# Patient Record
Sex: MALE | Race: BLACK OR AFRICAN AMERICAN | Employment: FULL TIME | URBAN - METROPOLITAN AREA
[De-identification: names, ages, dates, MRNs, and addresses within clinical notes are randomized per-mention and may not be internally consistent; named-entity substitution may affect disease eponyms.]

---

## 2020-10-05 VITALS
TEMPERATURE: 97.9 F | BODY MASS INDEX: 37.93 KG/M2 | WEIGHT: 236 LBS | SYSTOLIC BLOOD PRESSURE: 131 MMHG | HEART RATE: 75 BPM | OXYGEN SATURATION: 95 % | DIASTOLIC BLOOD PRESSURE: 86 MMHG | HEIGHT: 66 IN

## 2020-10-05 RX ORDER — CLOTRIMAZOLE AND BETAMETHASONE DIPROPIONATE 10; .64 MG/G; MG/G
CREAM TOPICAL 2 TIMES DAILY
COMMUNITY
End: 2020-10-07

## 2020-10-05 RX ORDER — AMOXICILLIN AND CLAVULANATE POTASSIUM 875; 125 MG/1; MG/1
TABLET, FILM COATED ORAL EVERY 12 HOURS
COMMUNITY
End: 2020-10-07

## 2020-10-07 ENCOUNTER — OFFICE VISIT (OUTPATIENT)
Dept: INTERNAL MEDICINE CLINIC | Age: 25
End: 2020-10-07
Payer: COMMERCIAL

## 2020-10-07 ENCOUNTER — HOSPITAL ENCOUNTER (OUTPATIENT)
Dept: LAB | Age: 25
Discharge: HOME | End: 2020-10-07

## 2020-10-07 VITALS
BODY MASS INDEX: 37.7 KG/M2 | HEIGHT: 66 IN | OXYGEN SATURATION: 96 % | WEIGHT: 234.6 LBS | TEMPERATURE: 97.8 F | DIASTOLIC BLOOD PRESSURE: 80 MMHG | RESPIRATION RATE: 19 BRPM | SYSTOLIC BLOOD PRESSURE: 133 MMHG | HEART RATE: 83 BPM

## 2020-10-07 DIAGNOSIS — Z72.0 TOBACCO ABUSE: ICD-10-CM

## 2020-10-07 DIAGNOSIS — K21.9 GASTROESOPHAGEAL REFLUX DISEASE, UNSPECIFIED WHETHER ESOPHAGITIS PRESENT: ICD-10-CM

## 2020-10-07 DIAGNOSIS — B35.4 TINEA CORPORIS: ICD-10-CM

## 2020-10-07 DIAGNOSIS — I10 ESSENTIAL HYPERTENSION: Primary | ICD-10-CM

## 2020-10-07 DIAGNOSIS — Z00.00 HEALTHCARE MAINTENANCE: ICD-10-CM

## 2020-10-07 DIAGNOSIS — E66.01 SEVERE OBESITY (HCC): ICD-10-CM

## 2020-10-07 PROCEDURE — 99204 OFFICE O/P NEW MOD 45 MIN: CPT | Performed by: INTERNAL MEDICINE

## 2020-10-07 RX ORDER — VARENICLINE TARTRATE 1 MG/1
TABLET, FILM COATED ORAL
Qty: 60 TAB | Refills: 1 | Status: SHIPPED | OUTPATIENT
Start: 2020-10-07 | End: 2021-01-05

## 2020-10-07 RX ORDER — SUCRALFATE 1 G/10ML
1 SUSPENSION ORAL 4 TIMES DAILY
Qty: 400 ML | Refills: 0 | Status: SHIPPED | OUTPATIENT
Start: 2020-10-07 | End: 2021-10-25

## 2020-10-07 RX ORDER — VARENICLINE TARTRATE 25 MG
KIT ORAL
Qty: 1 DOSE PACK | Refills: 0 | Status: SHIPPED | OUTPATIENT
Start: 2020-10-07 | End: 2021-10-25

## 2020-10-07 RX ORDER — PHENOL/SODIUM PHENOLATE
20 AEROSOL, SPRAY (ML) MUCOUS MEMBRANE DAILY
Qty: 30 TAB | Refills: 5 | Status: SHIPPED | OUTPATIENT
Start: 2020-10-07 | End: 2021-10-25

## 2020-10-07 NOTE — PROGRESS NOTES
Daxa Gonzales presents today for No chief complaint on file. Is someone accompanying this pt? No    Is the patient using any DME equipment during OV? No    Depression Screening:  3 most recent PHQ Screens 10/7/2020   Little interest or pleasure in doing things Not at all   Feeling down, depressed, irritable, or hopeless Not at all   Total Score PHQ 2 0       Learning Assessment:  Learning Assessment 10/7/2020   PRIMARY LEARNER Patient   HIGHEST LEVEL OF EDUCATION - PRIMARY LEARNER  GRADUATED HIGH SCHOOL OR GED   BARRIERS PRIMARY LEARNER NONE   CO-LEARNER CAREGIVER No   PRIMARY LANGUAGE ENGLISH   LEARNER PREFERENCE PRIMARY LISTENING     DEMONSTRATION   ANSWERED BY patient   RELATIONSHIP SELF       Abuse Screening:  No flowsheet data found. Fall Risk  No flowsheet data found. Health Maintenance reviewed and discussed and ordered per Provider. Health Maintenance Due   Topic Date Due    DTaP/Tdap/Td series (1 - Tdap) 01/14/2016    Flu Vaccine (1) 09/01/2020   . Coordination of Care:  1. Have you been to the ER, urgent care clinic since your last visit? Hospitalized since your last visit? No    2. Have you seen or consulted any other health care providers outside of the [facility] since your last visit? Include any pap smears or colon screening.  No

## 2020-10-07 NOTE — PROGRESS NOTES
1. Severe obesity ([address]  He and I discussed the essential need for weight loss. He also will need a lipid panel as well. He is agreed to work on cutting back his calories a bit  - LIPID PANEL; Future    2. Essential hypertension  Patient's blood pressure is not quite at goal today but given that he is a smoker and is obese I believe if we can get a handle on at least 1 of these 2 items it may very well correct his hypertension. We will hold off on medications for now but will check a basic metabolic panel to look at his renal function  - METABOLIC PANEL, BASIC; Future    3. Gastroesophageal reflux disease, unspecified whether esophagitis present  This is an acute problem likely secondary to smoking and NSAID use for aches and pains. Will start Carafate 4 times daily for 10 days and start a PPI. He will need to continue the PPI for at least a full month or although I have given him plenty of refills. Should he continue to have issues going forward he will need H. pylori testing and an EGD  - Omeprazole delayed release (PRILOSEC D/R) 20 mg tablet; Take 1 Tab by mouth daily. Dispense: 30 Tab; Refill: 5  - sucralfate (CARAFATE) 100 mg/mL suspension; Take 5 mL by mouth four (4) times daily. Dispense: 400 mL; Refill: 0    4. Healthcare maintenance  Given his family history of diabetes and dyslipidemia we will check hemoglobin A1c and a lipid panel. The patient's refused  exam today but I did educate him on the need to do testicular self checks  - LIPID PANEL; Future  - HEMOGLOBIN A1C WITH EAG; Future    5. Tinea corporis  Part of this looks chronic while another part of it looks like it may be tinea. Will start terbinafine and continue hydrocortisone cream.  I suspect this is chronic due to his wearing boots though  - terbinafine 1 % topical gel; Apply  to affected area two (2) times a day. Dispense: 12 oz; Refill: 2    6.  Tobacco abuse  We had a very long conversation regarding his tobacco use and the danger it presents for his health. He has agreed to start Chantix. I have warned him about the possibility of it triggering depression and irritability. He will stop immediately if any of these symptoms start  - varenicline (CHANTIX STARTER IRENA) 0.5 mg (11)- 1 mg (42) DsPk; Take according to package instruction  Dispense: 1 Dose Pack; Refill: 0  - varenicline (Chantix Continuing Month Box) 1 mg tablet; Take one tab bid  Dispense: 60 Tab; Refill: 1      Cc: new patient. Establish care     HPI   This is a 43-year-old -American gentleman with a reported history of hypertension and obesity who presents new to my practice. He has not really seen a doctor in years. He reports he has the diagnosis of hypertension but does not take anything for it. He is checked his blood pressure over the past 6 months notes it is normally systolic in the 995Z to 578Y. He is also admitted that he is continued to smoke a pack per day and that he is gained several pounds over the past few months. 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 He also reports he has been having epigastric discomfort. He describes it as an ache alternating with a burning sensation that goes up into his mouth. It is worse at night when he lays flat. He reports he does take ibuprofen from time to time and that will usually set it off. Tums helps a little bit but he still has symptoms through it. He recalls taking Prilosec once but cannot remember if it did anything. He not has no nausea or vomiting and he has no blood in his stools and he has no diarrhea. Otherwise he feels good he has no shortness of breath he has no chest pain chest pressure fevers or chills  Patient Active Problem List   Diagnosis Code    Severe [facility] ([facility] E66.01        No current outpatient medications on file prior to visit.      No current facility-administered medications on file prior to visit.         ROS  - GEN: + weight gain, no fevers or chills  - HEENT: no vision changes, no tinnitus, no sore throat  - CV: no cp, palpitations or edema  - RESP: no sob, cough  - ABD: no n/v/d, no blood in stool + indigestion  - : no dysuria or changes in freq. - SKIN: + rashes right leg,no ulcers  - Neuro: no resting tremors, parasthesia in extremities, no headaches  - MS: No weakness in extremities, no gait abnormalities  - Psych: negative for depression or anxiety      Visit Vitals  /80 (BP 1 Location: Left arm, BP Patient Position: Sitting)   Pulse 83   Temp 97.8 °F (36.6 °C) (Temporal)   Resp 19   Ht 5' 6\" (1.676 m)   Wt 234 lb 9.6 oz (106.4 kg)   SpO2 96%   BMI 37.87 kg/m²           Physical Exam  Constitutional:       Appearance: Normal appearance. Morbidly obese. NAD and pleasant  HENT:      Head: Normocephalic. Nose: Nose normal.      Mouth/Throat:      Mouth: Mucous membranes are moist. Throat not inflammed  Eyes:      Extraocular Movements: Extraocular movements intact. Conjunctiva/sclera: Conjunctivae normal. Sclera anicteric     Pupils: Pupils are equal, round, and reactive to light. Cardiovascular:      Rate and Rhythm: Normal rate and regular rhythm. Pulses: Normal pulses. Pulmonary:      Effort: No respiratory distress. Breath sounds: CTAB and No stridor. No rhonchi. Abdominal:      General: There is no distension. NT, ND  Neurological:      Mental Status: patient is alert and oriented times 3. No resting tremor, normal gait     Cranial Nerves: cranial nerves grossly intact  Muskuloskeletal     Full ROM in extremities     Normal gait  Skin     Dry there is a papular dark colored area on the medial aspect of the right lower leg at the boot line. There is no warmth associated with the lesion. There is no redness.  Boarders are irregular       Deferred  Psychiatry     Calm, normal affect, interacting normally

## 2021-10-25 ENCOUNTER — OFFICE VISIT (OUTPATIENT)
Dept: INTERNAL MEDICINE CLINIC | Age: 26
End: 2021-10-25
Payer: COMMERCIAL

## 2021-10-25 VITALS
HEIGHT: 66 IN | WEIGHT: 235.2 LBS | SYSTOLIC BLOOD PRESSURE: 133 MMHG | BODY MASS INDEX: 37.8 KG/M2 | TEMPERATURE: 97.2 F | OXYGEN SATURATION: 96 % | RESPIRATION RATE: 20 BRPM | DIASTOLIC BLOOD PRESSURE: 77 MMHG | HEART RATE: 96 BPM

## 2021-10-25 DIAGNOSIS — E66.01 MORBID OBESITY (HCC): ICD-10-CM

## 2021-10-25 DIAGNOSIS — I10 ESSENTIAL HYPERTENSION: ICD-10-CM

## 2021-10-25 DIAGNOSIS — Z00.00 ANNUAL PHYSICAL EXAM: ICD-10-CM

## 2021-10-25 DIAGNOSIS — B35.9 TINEA: ICD-10-CM

## 2021-10-25 DIAGNOSIS — K29.70 GASTRITIS WITHOUT BLEEDING, UNSPECIFIED CHRONICITY, UNSPECIFIED GASTRITIS TYPE: Primary | ICD-10-CM

## 2021-10-25 DIAGNOSIS — R63.5 WEIGHT GAIN: ICD-10-CM

## 2021-10-25 DIAGNOSIS — Z72.0 TOBACCO ABUSE: ICD-10-CM

## 2021-10-25 LAB — HBA1C MFR BLD HPLC: 5.7 %

## 2021-10-25 PROCEDURE — 99395 PREV VISIT EST AGE 18-39: CPT | Performed by: INTERNAL MEDICINE

## 2021-10-25 PROCEDURE — 83036 HEMOGLOBIN GLYCOSYLATED A1C: CPT | Performed by: INTERNAL MEDICINE

## 2021-10-25 PROCEDURE — 36416 COLLJ CAPILLARY BLOOD SPEC: CPT | Performed by: INTERNAL MEDICINE

## 2021-10-25 PROCEDURE — 99214 OFFICE O/P EST MOD 30 MIN: CPT | Performed by: INTERNAL MEDICINE

## 2021-10-25 RX ORDER — OMEPRAZOLE 20 MG/1
20 CAPSULE, DELAYED RELEASE ORAL DAILY
Qty: 30 CAPSULE | Refills: 5 | Status: SHIPPED | OUTPATIENT
Start: 2021-10-25 | End: 2022-09-28

## 2021-10-25 RX ORDER — VARENICLINE TARTRATE 25 MG
KIT ORAL
Qty: 1 DOSE PACK | Refills: 0 | Status: SHIPPED | OUTPATIENT
Start: 2021-10-25 | End: 2022-09-28

## 2021-10-25 NOTE — PROGRESS NOTES
1. Gastritis without bleeding, unspecified chronicity, unspecified gastritis type  He continues to have indigestion especially when he eats acidic foods. I explained to him how important it is to follow-up with me and we are going to set up a virtual follow-up in 3 to 4 weeks. Start omeprazole 20 mg daily  - omeprazole (PRILOSEC) 20 mg capsule; Take 1 Capsule by mouth daily. Dispense: 30 Capsule; Refill: 5    2. Tobacco abuse  He is willing to quit smoking. We will try Chantix again. Follow-up in 3 to 4 weeks to discuss effects.  - varenicline (CHANTIX STARTER IRENA) 0.5 mg (11)- 1 mg (42) DsPk; Use as directed  Dispense: 1 Dose Pack; Refill: 0    3. Essential hypertension  Pressure actually looks pretty good today. Check a CMP  - METABOLIC PANEL, COMPREHENSIVE    4. Morbid obesity ([address]  His weight has gotten worse. I am referring to nutrition  - REFERRAL TO NUTRITION    5. Annual physical exam  Annual physical exam performed. Check a lipid profile and a hemoglobin A1c. I have requested that he get the Covid vaccine and he says he will look into it  - AMB POC HEMOGLOBIN A1C  - LIPID PANEL  - COLLECTION CAPILLARY BLOOD SPECIMEN    6. Weight gain  Is a worsening problem. I am going to check a TSH to make sure that his thyroid is not abnormal  - TSH 3RD GENERATION    7. Tinea  This is an acute problem. Start Lamisil. I did explain that he is going to need to treat this for 2 to 3 months  - terbinafine HCL (LAMISIL) 1 % topical cream; Apply  to affected area two (2) times a day. Dispense: 30 g; Refill: 2      Chief Complaint   Patient presents with    Hypertension     follow up    Migraine     headache        HPI   This is a very pleasant 71-year-old gentleman who presents in follow-up. I saw him roughly a year ago at which time I had started him on Chantix and started him on omeprazole and Carafate for gastritis. He was lost to follow-up.   He reports that he is continue to have indigestion and stopped taking the omeprazole. He notes that orange juice or other juice products really cause it to flare. His symptoms are worse at night. Symptoms are worse after he eats certain foods especially if they have tomato sauce in it. He denies any chest pain or palpitations. He reports a slight headache which she has from time to time. He has no nausea or vomiting. He reports he has gained weight over the past year and is really not sure why. He denies any dysuria or change in urinary frequency. He continues to smoke but is willing to quit. He has not received his Covid vaccinations but he is willing to get them. He otherwise has no other complaints today  Patient Active Problem List   Diagnosis Code    [facility] ([facility] E66.01        No current outpatient medications on file prior to visit. No current facility-administered medications on file prior to visit. ROS  - GEN: ++ weight gain, no fevers or chills  - HEENT: no vision changes, no tinnitus, no sore throat  - CV: no cp, palpitations or edema  - RESP: no sob, cough  - ABD: no n/v/d, no blood in stool + severe indigestion  - : no dysuria or changes in freq. - SKIN: no rashes, ulcers  - Neuro: no resting tremors, parasthesia in extremities, occasional headaches  - MS: No weakness in extremities, no gait abnormalities  - Psych: negative for depression or anxiety      Visit Vitals  /77   Pulse 96   Temp 97.2 °F (36.2 °C)   Resp 20   Ht 5' 6\" (1.676 m)   Wt 235 lb 3.2 oz (106.7 kg)   SpO2 96%   BMI 37.96 kg/m²           Physical Exam  Constitutional:       Appearance: Normal appearance. Morbidly obese. NAD and pleasant  HENT:      Head: Normocephalic. Nose: Nose normal.      Mouth/Throat:      Mouth: Mucous membranes are moist. Throat not inflammed  Eyes:      Extraocular Movements: Extraocular movements intact.       Conjunctiva/sclera: Conjunctivae normal. Sclera anicteric    PERRL  Cardiovascular:      Rate and Rhythm: Normal rate and regular rhythm. Pulses: Normal pulses. Pulmonary:      Effort: No respiratory distress. Breath sounds: CTAB and No stridor. No rhonchi. Abdominal:      General: There is no distension. NT, ND  Neurological:      Mental Status: patient is alert and oriented times 3.  No resting tremor, normal gait     Cranial Nerves: cranial nerves grossly intact  Muskuloskeletal     Full ROM in extremities     Normal gait  Skin     Macular lesion with color change rle       Deferred  Psychiatry     Calm, normal affect, interacting normally

## 2021-10-25 NOTE — PROGRESS NOTES
Oleg Lopez presents today for   Chief Complaint   Patient presents with    Hypertension     follow up    Migraine     headache       Is someone accompanying this pt? noIs the patient using any DME equipment during OV? no    Depression Screening:  3 most recent PHQ Screens 10/25/2021   Little interest or pleasure in doing things Not at all   Feeling down, depressed, irritable, or hopeless Not at all   Total Score PHQ 2 0       Learning Assessment:  Learning Assessment 10/7/2020   PRIMARY LEARNER Patient   HIGHEST LEVEL OF EDUCATION - PRIMARY LEARNER  GRADUATED HIGH SCHOOL OR GED   BARRIERS PRIMARY LEARNER NONE   CO-LEARNER CAREGIVER No   PRIMARY LANGUAGE ENGLISH   LEARNER PREFERENCE PRIMARY LISTENING     DEMONSTRATION   ANSWERED BY patient   RELATIONSHIP SELF         Health Maintenance reviewed and discussed and ordered per Provider. Health Maintenance Due   Topic Date Due    Hepatitis C Screening  Never done    Pneumococcal 0-64 years (1 of 2 - PPSV23) Never done    COVID-19 Vaccine (1) Never done    DTaP/Tdap/Td series (1 - Tdap) Never done    Flu Vaccine (1) Never done   . Coordination of Care:  1. \"Have you been to the ER, urgent care clinic since your last visit? Hospitalized since your last visit? \" No    2. \"Have you seen or consulted any other health care providers outside of the [address] [place] [address] since your last visit? \" No

## 2021-10-25 NOTE — PROGRESS NOTES
Fingerstick for HBa1C done in right  middle finger by Nkechi Whittaker LPN per order of Dafne Peña MD after cleaning area with alcohol wipe. Patient tolerated procedure well.

## 2022-03-09 ENCOUNTER — NURSE TRIAGE (OUTPATIENT)
Dept: OTHER | Facility: CLINIC | Age: 27
End: 2022-03-09

## 2022-03-09 NOTE — TELEPHONE ENCOUNTER
Received call from [employer] at [facility] with Red Flag Complaint. Subjective: Caller states \"I woke up with a headache, vomiting here and there. I usually have a few headaches, but this one was extreme. \"     Previously taking medication for migraines as needed- does NOT currently have any  Patient out of medication for HTN    Current Symptoms: Headache- worst headache he has ever had, heavy breathing at times- NOT currently, NO chest pain, NO blurred vision or dizziness, NO weakness or numbnesss      Onset: 12 hours ago; unchanged- easing up slightly    Associated Symptoms: reduced activity    Pain Severity: 8/10; throbbing; constant    Temperature: Denies    What has been tried: OTC Tylenol Extra Strength- NOT helping    LMP: NA Pregnant: NA    Recommended disposition: Go to ED Now. Patient agreeable. Care advice provided, patient verbalizes understanding; denies any other questions or concerns; instructed to call back for any new or worsening symptoms. Patient/caller agrees to proceed to [facility] Emergency Department    Attention Provider: Thank you for allowing me to participate in the care of your patient. The patient was connected to triage in response to information provided to the [facility]. Please do not respond through this encounter as the response is not directed to a shared pool.       Reason for Disposition   SEVERE headache, states 'worst headache' of life    Protocols used: HEADACHE-ADULT-[place]

## 2022-03-09 NOTE — TELEPHONE ENCOUNTER
Received call from Jason Rayo at [facility], caller not on line.      Complaint: bad headache, worst ever     Practice Name: Mont[facility] R[facility]MON[facility]    Caller's telephone number verified as [phone] Francisco Aguiar called    Unsuccessful attempt to re-connect with caller via phone, left message for return call to office    Reason for Disposition   Caller has cancelled the call before the first contact    Protocols used: NO CONTACT OR DUPLICATE CONTACT CALL-ADULT-

## 2022-03-19 PROBLEM — E66.01 SEVERE OBESITY (HCC): Status: ACTIVE | Noted: 2020-10-07

## 2022-09-28 ENCOUNTER — HOSPITAL ENCOUNTER (EMERGENCY)
Age: 27
Discharge: HOME | End: 2022-09-28
Attending: EMERGENCY MEDICINE

## 2022-09-28 VITALS
SYSTOLIC BLOOD PRESSURE: 159 MMHG | HEIGHT: 68 IN | TEMPERATURE: 98.2 F | BODY MASS INDEX: 36.37 KG/M2 | HEART RATE: 88 BPM | WEIGHT: 240 LBS | OXYGEN SATURATION: 96 % | RESPIRATION RATE: 16 BRPM | DIASTOLIC BLOOD PRESSURE: 68 MMHG

## 2022-09-28 DIAGNOSIS — T78.40XA ALLERGIC REACTION, INITIAL ENCOUNTER: Primary | ICD-10-CM

## 2022-09-28 PROCEDURE — 99283 EMERGENCY DEPT VISIT LOW MDM: CPT

## 2022-09-28 PROCEDURE — 74011636637 HC RX REV CODE- 636/637: Performed by: EMERGENCY MEDICINE

## 2022-09-28 RX ORDER — METHYLPREDNISOLONE 4 MG/1
TABLET ORAL
Qty: 1 DOSE PACK | Refills: 0 | Status: SHIPPED | OUTPATIENT
Start: 2022-09-28

## 2022-09-28 RX ORDER — HYDROXYZINE PAMOATE 25 MG/1
25 CAPSULE ORAL
Qty: 20 CAPSULE | Refills: 0 | Status: SHIPPED | OUTPATIENT
Start: 2022-09-28 | End: 2022-10-12

## 2022-09-28 RX ADMIN — PREDNISONE 50 MG: 20 TABLET ORAL at 08:57

## 2022-09-28 NOTE — ED TRIAGE NOTES
Pt states he noticed a rash on his arms 2 days ago, pt states he just started working at a [employer] and he is not sure if it is related to that or not.  Pt has not taken any benadryl for the rash

## 2022-09-28 NOTE — ED PROVIDER NOTES
EMERGENCY DEPARTMENT HISTORY AND PHYSICAL EXAM      Date: 9/28/2022  Patient Name: Dion Timmons    History of Presenting Illness     Chief Complaint   Patient presents with    Rash       History Provided By: Patient    HPI: Dion Timmons, 32 y.o. male with no significant past medical history, presents to ED complaining of rash. He states he began having a rash when he started a new job. He states first day he developed a rash in the right wrist.  He states the rash gets better or goes away after getting home. He states the rash became worse today and has extended to his back. He denies shortness of breath or wheezing. There are no other complaints, changes, or physical findings at this time. PCP: Trixie Browning MD    No current facility-administered medications on file prior to encounter. Current Outpatient Medications on File Prior to Encounter   Medication Sig Dispense Refill    [DISCONTINUED] terbinafine HCL (LAMISIL) 1 % topical cream Apply  to affected area two (2) times a day. 30 g 2    [DISCONTINUED] varenicline (CHANTIX STARTER IRENA) 0.5 mg (11)- 1 mg (42) DsPk Use as directed 1 Dose Pack 0    [DISCONTINUED] omeprazole (PRILOSEC) 20 mg capsule Take 1 Capsule by mouth daily. 30 Capsule 5       Past History     Past Medical History:  Past Medical History:   Diagnosis Date    Hypertension        Past Surgical History:  History reviewed. No pertinent surgical history. Family History:  Family History   Problem Relation Age of Onset    Hypertension Mother     Stroke Mother        Social History:  Social History     Tobacco Use    Smoking status: Every Day     Packs/day: 1.00     Years: 7.00     Pack years: 7.00     Types: Cigarettes    Smokeless tobacco: Never   Substance Use Topics    Alcohol use: Never    Drug use: Never       Allergies:  No Known Allergies    Review of Systems   Review of Systems   All other systems reviewed and are negative.     Physical Exam   Physical Exam  Vitals and nursing note reviewed. Constitutional:       General: He is not in acute distress. Appearance: He is well-developed. He is not diaphoretic. Comments: Obese male who appears in no distress. HENT:      Head: Normocephalic and atraumatic. No right periorbital erythema or left periorbital erythema. Jaw: No trismus. Right Ear: External ear normal. No drainage or swelling. Tympanic membrane is not perforated, erythematous or bulging. Left Ear: External ear normal. No drainage or swelling. Tympanic membrane is not perforated, erythematous or bulging. Nose: Nose normal. No mucosal edema or rhinorrhea. Right Sinus: No maxillary sinus tenderness or frontal sinus tenderness. Left Sinus: No maxillary sinus tenderness or frontal sinus tenderness. Mouth/Throat:      Mouth: No oral lesions. Dentition: No dental abscesses. Pharynx: Uvula midline. No oropharyngeal exudate, posterior oropharyngeal erythema or uvula swelling. Tonsils: No tonsillar abscesses. Eyes:      General: No scleral icterus. Right eye: No discharge. Left eye: No discharge. Conjunctiva/sclera: Conjunctivae normal.   Cardiovascular:      Rate and Rhythm: Normal rate and regular rhythm. Heart sounds: Normal heart sounds. No murmur heard. No friction rub. No gallop. Pulmonary:      Effort: Pulmonary effort is normal. No tachypnea, accessory muscle usage or respiratory distress. Breath sounds: Normal breath sounds. No decreased breath sounds, wheezing, rhonchi or rales. Abdominal:      General: Bowel sounds are normal. There is no distension. Palpations: Abdomen is soft. Tenderness: There is no abdominal tenderness. Musculoskeletal:         General: No tenderness. Normal range of motion. Cervical back: Normal range of motion and neck supple. Lymphadenopathy:      Cervical: No cervical adenopathy. Skin:     General: Skin is warm and dry. Findings: Rash present. Comments: He has a maculopapular rash worse right wrist, right upper back extending to the right axilla. Also has a minimal rash left axilla. Neurological:      Mental Status: He is alert and oriented to person, place, and time. Psychiatric:         Judgment: Judgment normal.       Lab and Diagnostic Study Results   Labs -   No results found for this or any previous visit (from the past 12 hour(s)). Radiologic Studies -   @lastxrresult@  CT Results  (Last 48 hours)      None          CXR Results  (Last 48 hours)      None            Medical Decision Making and ED Course   Differential Diagnosis & Medical Decision Making Provider Note:       - I am the first provider for this patient. I reviewed the vital signs, available nursing notes, past medical history, past surgical history, family history and social history. The patients presenting problems have been discussed, and they are in agreement with the care plan formulated and outlined with them. I have encouraged them to ask questions as they arise throughout their visit. Vital Signs-Reviewed the patient's vital signs. Patient Vitals for the past 12 hrs:   Temp Pulse Resp BP SpO2   09/28/22 0834 98.2 °F (36.8 °C) 88 16 (!) 159/68 96 %       ED Course:          Procedures     Disposition   Disposition: Condition stable  DC- Adult Discharges: All of the diagnostic tests were reviewed and questions answered. Diagnosis, care plan and treatment options were discussed. The patient understands the instructions and will follow up as directed. The patients results have been reviewed with them. They have been counseled regarding their diagnosis. The patient verbally convey understanding and agreement of the signs, symptoms, diagnosis, treatment and prognosis and additionally agrees to follow up as recommended with their PCP in 24 - 48 hours.   They also agree with the care-plan and convey that all of their questions have been answered. I have also put together some discharge instructions for them that include: 1) educational information regarding their diagnosis, 2) how to care for their diagnosis at home, as well a 3) list of reasons why they would want to return to the ED prior to their follow-up appointment, should their condition change. DISCHARGE PLAN:  1. Current Discharge Medication List        START taking these medications    Details   hydrOXYzine pamoate (VISTARIL) 25 mg capsule Take 1 Capsule by mouth three (3) times daily as needed for Itching for up to 14 days. Qty: 20 Capsule, Refills: 0      methylPREDNISolone (Medrol, Anupam,) 4 mg tablet Use as directed on pack  Qty: 1 Dose Pack, Refills: 0           2. Follow-up Information       Follow up With Specialties Details Why Contact Info    Ginger Trinidad MD Internal Medicine Physician In 3 days  [address] [place] [address]  [phone]      [facility] EMERGENCY DEPT Emergency Medicine  If symptoms worsen [facility] [address] [id number]  [phone]          3. Return to ED if worse   4. Current Discharge Medication List        START taking these medications    Details   hydrOXYzine pamoate (VISTARIL) 25 mg capsule Take 1 Capsule by mouth three (3) times daily as needed for Itching for up to 14 days. Qty: 20 Capsule, Refills: 0  Start date: 9/28/2022, End date: 10/12/2022      methylPREDNISolone (Medrol, Anupam,) 4 mg tablet Use as directed on pack  Qty: 1 Dose Pack, Refills: 0  Start date: 9/28/2022            Remove if admitted/transferred    Diagnosis/Clinical Impression     Clinical Impression:   1. Allergic reaction, initial encounter        Attestations: I, Radha Gavlan MD, am the primary clinician of record. Please note that this dictation was completed with [employer], the [employer] voice recognition software.   Quite often unanticipated grammatical, syntax, homophones, and other interpretive errors are inadvertently transcribed by the computer software. Please disregard these errors. Please excuse any errors that have escaped final proofreading. Thank you.

## 2024-09-07 ENCOUNTER — HOSPITAL ENCOUNTER (EMERGENCY)
Age: 29
Discharge: HOME | End: 2024-09-07
Attending: FAMILY MEDICINE
Payer: COMMERCIAL

## 2024-09-07 VITALS
OXYGEN SATURATION: 98 % | TEMPERATURE: 98.1 F | HEIGHT: 68 IN | RESPIRATION RATE: 16 BRPM | WEIGHT: 259 LBS | HEART RATE: 70 BPM | DIASTOLIC BLOOD PRESSURE: 103 MMHG | BODY MASS INDEX: 39.25 KG/M2 | SYSTOLIC BLOOD PRESSURE: 162 MMHG

## 2024-09-07 DIAGNOSIS — I10 ESSENTIAL HYPERTENSION: ICD-10-CM

## 2024-09-07 DIAGNOSIS — H00.11 CHALAZION OF RIGHT UPPER EYELID: ICD-10-CM

## 2024-09-07 DIAGNOSIS — L02.91 ABSCESS: Primary | ICD-10-CM

## 2024-09-07 PROCEDURE — 2500000003 HC RX 250 WO HCPCS: Performed by: FAMILY MEDICINE

## 2024-09-07 PROCEDURE — 6370000000 HC RX 637 (ALT 250 FOR IP): Performed by: FAMILY MEDICINE

## 2024-09-07 PROCEDURE — 10061 I&D ABSCESS COMP/MULTIPLE: CPT

## 2024-09-07 PROCEDURE — 99283 EMERGENCY DEPT VISIT LOW MDM: CPT

## 2024-09-07 RX ORDER — LIDOCAINE HYDROCHLORIDE 10 MG/ML
5 INJECTION, SOLUTION INFILTRATION; PERINEURAL ONCE
Status: COMPLETED | OUTPATIENT
Start: 2024-09-07 | End: 2024-09-07

## 2024-09-07 RX ORDER — SULFAMETHOXAZOLE/TRIMETHOPRIM 800-160 MG
1 TABLET ORAL 2 TIMES DAILY
Qty: 14 TABLET | Refills: 0 | Status: SHIPPED | OUTPATIENT
Start: 2024-09-07 | End: 2024-09-14

## 2024-09-07 RX ORDER — SULFAMETHOXAZOLE/TRIMETHOPRIM 800-160 MG
1 TABLET ORAL ONCE
Status: COMPLETED | OUTPATIENT
Start: 2024-09-07 | End: 2024-09-07

## 2024-09-07 RX ORDER — AMLODIPINE BESYLATE 5 MG/1
5 TABLET ORAL DAILY
Qty: 30 TABLET | Refills: 0 | Status: SHIPPED | OUTPATIENT
Start: 2024-09-07 | End: 2024-10-07

## 2024-09-07 RX ADMIN — LIDOCAINE HYDROCHLORIDE 5 ML: 10 INJECTION, SOLUTION INFILTRATION; PERINEURAL at 22:55

## 2024-09-07 RX ADMIN — SULFAMETHOXAZOLE AND TRIMETHOPRIM 1 TABLET: 800; 160 TABLET ORAL at 23:40

## 2024-09-07 ASSESSMENT — PAIN DESCRIPTION - ORIENTATION: ORIENTATION: LEFT

## 2024-09-07 ASSESSMENT — LIFESTYLE VARIABLES
HOW MANY STANDARD DRINKS CONTAINING ALCOHOL DO YOU HAVE ON A TYPICAL DAY: PATIENT DOES NOT DRINK
HOW OFTEN DO YOU HAVE A DRINK CONTAINING ALCOHOL: NEVER

## 2024-09-07 ASSESSMENT — PAIN SCALES - GENERAL: PAINLEVEL_OUTOF10: 7

## 2024-09-07 ASSESSMENT — PAIN DESCRIPTION - LOCATION: LOCATION: HEAD

## 2024-09-07 ASSESSMENT — PAIN DESCRIPTION - DESCRIPTORS: DESCRIPTORS: THROBBING

## 2024-09-09 ENCOUNTER — HOSPITAL ENCOUNTER (EMERGENCY)
Age: 29
Discharge: HOME | End: 2024-09-09
Attending: FAMILY MEDICINE
Payer: COMMERCIAL

## 2024-09-09 VITALS
TEMPERATURE: 98 F | SYSTOLIC BLOOD PRESSURE: 138 MMHG | DIASTOLIC BLOOD PRESSURE: 95 MMHG | RESPIRATION RATE: 16 BRPM | OXYGEN SATURATION: 95 % | HEART RATE: 76 BPM

## 2024-09-09 DIAGNOSIS — Z51.89 ENCOUNTER FOR WOUND RE-CHECK: Primary | ICD-10-CM

## 2024-09-09 PROCEDURE — 6370000000 HC RX 637 (ALT 250 FOR IP): Performed by: FAMILY MEDICINE

## 2024-09-09 PROCEDURE — 99283 EMERGENCY DEPT VISIT LOW MDM: CPT

## 2024-09-09 RX ORDER — GINSENG 100 MG
CAPSULE ORAL
Status: COMPLETED | OUTPATIENT
Start: 2024-09-09 | End: 2024-09-09

## 2024-09-09 RX ADMIN — BACITRACIN: 500 OINTMENT TOPICAL at 06:25

## 2024-09-09 ASSESSMENT — LIFESTYLE VARIABLES
HOW OFTEN DO YOU HAVE A DRINK CONTAINING ALCOHOL: MONTHLY OR LESS
HOW MANY STANDARD DRINKS CONTAINING ALCOHOL DO YOU HAVE ON A TYPICAL DAY: 1 OR 2

## 2024-11-21 ENCOUNTER — HOSPITAL ENCOUNTER (EMERGENCY)
Age: 29
Discharge: HOME | End: 2024-11-21
Attending: EMERGENCY MEDICINE
Payer: COMMERCIAL

## 2024-11-21 VITALS
WEIGHT: 264.8 LBS | OXYGEN SATURATION: 95 % | BODY MASS INDEX: 40.13 KG/M2 | HEIGHT: 68 IN | HEART RATE: 75 BPM | RESPIRATION RATE: 16 BRPM | TEMPERATURE: 98.4 F | SYSTOLIC BLOOD PRESSURE: 158 MMHG | DIASTOLIC BLOOD PRESSURE: 104 MMHG

## 2024-11-21 DIAGNOSIS — R11.11 VOMITING WITHOUT NAUSEA, UNSPECIFIED VOMITING TYPE: ICD-10-CM

## 2024-11-21 DIAGNOSIS — R51.9 ACUTE NONINTRACTABLE HEADACHE, UNSPECIFIED HEADACHE TYPE: Primary | ICD-10-CM

## 2024-11-21 DIAGNOSIS — I10 HYPERTENSION, UNSPECIFIED TYPE: ICD-10-CM

## 2024-11-21 PROCEDURE — 6370000000 HC RX 637 (ALT 250 FOR IP): Performed by: EMERGENCY MEDICINE

## 2024-11-21 PROCEDURE — 99283 EMERGENCY DEPT VISIT LOW MDM: CPT

## 2024-11-21 RX ORDER — ONDANSETRON 4 MG/1
4 TABLET, ORALLY DISINTEGRATING ORAL
Status: COMPLETED | OUTPATIENT
Start: 2024-11-21 | End: 2024-11-21

## 2024-11-21 RX ORDER — HYDRALAZINE HYDROCHLORIDE 25 MG/1
12.5 TABLET, FILM COATED ORAL
Status: COMPLETED | OUTPATIENT
Start: 2024-11-21 | End: 2024-11-21

## 2024-11-21 RX ORDER — IBUPROFEN 400 MG/1
800 TABLET, FILM COATED ORAL ONCE
Status: COMPLETED | OUTPATIENT
Start: 2024-11-21 | End: 2024-11-21

## 2024-11-21 RX ORDER — HYDRALAZINE HYDROCHLORIDE 10 MG/1
10 TABLET, FILM COATED ORAL 2 TIMES DAILY
Qty: 30 TABLET | Refills: 0 | Status: SHIPPED | OUTPATIENT
Start: 2024-11-21 | End: 2024-12-06

## 2024-11-21 RX ORDER — IBUPROFEN 600 MG/1
600 TABLET, FILM COATED ORAL 4 TIMES DAILY PRN
Qty: 20 TABLET | Refills: 1 | Status: SHIPPED | OUTPATIENT
Start: 2024-11-21

## 2024-11-21 RX ORDER — HYDRALAZINE HYDROCHLORIDE 25 MG/1
25 TABLET, FILM COATED ORAL
Status: DISCONTINUED | OUTPATIENT
Start: 2024-11-21 | End: 2024-11-21

## 2024-11-21 RX ORDER — ONDANSETRON 4 MG/1
4 TABLET, ORALLY DISINTEGRATING ORAL 3 TIMES DAILY PRN
Qty: 15 TABLET | Refills: 0 | Status: SHIPPED | OUTPATIENT
Start: 2024-11-21

## 2024-11-21 RX ADMIN — ONDANSETRON 4 MG: 4 TABLET, ORALLY DISINTEGRATING ORAL at 19:28

## 2024-11-21 RX ADMIN — IBUPROFEN 800 MG: 400 TABLET, FILM COATED ORAL at 19:29

## 2024-11-21 RX ADMIN — HYDRALAZINE HYDROCHLORIDE 12.5 MG: 25 TABLET ORAL at 19:29

## 2024-11-21 ASSESSMENT — PAIN SCALES - GENERAL: PAINLEVEL_OUTOF10: 8

## 2024-11-21 ASSESSMENT — PAIN DESCRIPTION - LOCATION: LOCATION: HEAD

## 2024-11-21 ASSESSMENT — LIFESTYLE VARIABLES
HOW OFTEN DO YOU HAVE A DRINK CONTAINING ALCOHOL: NEVER
HOW MANY STANDARD DRINKS CONTAINING ALCOHOL DO YOU HAVE ON A TYPICAL DAY: PATIENT DOES NOT DRINK

## 2024-11-21 ASSESSMENT — PAIN - FUNCTIONAL ASSESSMENT: PAIN_FUNCTIONAL_ASSESSMENT: 0-10

## 2024-11-21 NOTE — ED TRIAGE NOTES
Pt to ER c/o headache over his left eye x2-3 hrs. Reports hx of headaches. Also reports that he is supposed to be taking B/P meds that he isn't taking.

## 2024-11-22 NOTE — ED NOTES
11/22/2024 1857 - Received call from pt. That he is unable to  prescription at [facility] due to pharmacy not havine received 1 prescription out of 3 placed for pt. Pt. Able to  from [facility] all other medication. Hydralazine called into [facility] at pts. Request.

## 2024-11-22 NOTE — ED PROVIDER NOTES
[facility] EMERGENCY DEPT  EMERGENCY DEPARTMENT ENCOUNTER      Pt Name: Santosh Trevizo  MRN: [id number]  Birthdate 1995  Date of evaluation: 11/21/2024  Provider: Gaston Barrios MD    CHIEF COMPLAINT       Chief Complaint   Patient presents with    Headache       HPI:  Santosh Trevizo is a 29 y.o. male who presents to the emergency department pt c/o left forehead ha x 2 hours,h/o same. H/o htn not curr on meds.  + n/v  no other pain or sx's     HPI    Nursing Notes were reviewed.    REVIEW OF SYSTEMS    (2-9 systems for level 4, 10 or more for level 5)     Review of Systems    Except as noted above the remainder of the review of systems was reviewed and negative.       PAST MEDICAL HISTORY     Past Medical History:   Diagnosis Date    Hypertension          SURGICAL HISTORY     History reviewed. No pertinent surgical history.      CURRENT MEDICATIONS       Previous Medications    AMLODIPINE (NORVASC) 5 MG TABLET    Take 1 tablet by mouth daily    METHYLPREDNISOLONE (MEDROL DOSEPACK) 4 MG TABLET    Use as directed on pack       ALLERGIES     Patient has no known allergies.    FAMILY HISTORY       Family History   Problem Relation Age of Onset    Stroke Mother     Hypertension Mother           SOCIAL HISTORY       Social History     Socioeconomic History    Marital status: Single     Spouse name: None    Number of children: None    Years of education: None    Highest education level: None   Tobacco Use    Smoking status: Every Day     Current packs/day: 1.00     Types: Cigarettes    Smokeless tobacco: Never   Substance and Sexual Activity    Alcohol use: Never    Drug use: Never       SCREENINGS         [place] Coma Scale  Eye Opening: Spontaneous  Best Verbal Response: Oriented  Best Motor Response: Obeys commands  [place] Coma Scale Score: 15                     CIWA Assessment  BP: (!) 156/107  Pulse: 75                 PHYSICAL EXAM    (up to 7 for level 4, 8 or more for level 5)     ED Triage Vitals

## 2025-04-01 ENCOUNTER — HOSPITAL ENCOUNTER (EMERGENCY)
Age: 30
Discharge: HOME | End: 2025-04-01
Attending: EMERGENCY MEDICINE
Payer: COMMERCIAL

## 2025-04-01 VITALS
HEIGHT: 68 IN | HEART RATE: 95 BPM | BODY MASS INDEX: 39.4 KG/M2 | DIASTOLIC BLOOD PRESSURE: 122 MMHG | SYSTOLIC BLOOD PRESSURE: 173 MMHG | WEIGHT: 260 LBS | RESPIRATION RATE: 16 BRPM | OXYGEN SATURATION: 100 % | TEMPERATURE: 98.2 F

## 2025-04-01 DIAGNOSIS — J02.9 VIRAL PHARYNGITIS: Primary | ICD-10-CM

## 2025-04-01 DIAGNOSIS — I16.0 HYPERTENSIVE URGENCY: ICD-10-CM

## 2025-04-01 LAB — S PYO DNA THROAT QL NAA+PROBE: NOT DETECTED

## 2025-04-01 PROCEDURE — 6370000000 HC RX 637 (ALT 250 FOR IP): Performed by: EMERGENCY MEDICINE

## 2025-04-01 PROCEDURE — 99283 EMERGENCY DEPT VISIT LOW MDM: CPT

## 2025-04-01 PROCEDURE — 87651 STREP A DNA AMP PROBE: CPT

## 2025-04-01 RX ORDER — IBUPROFEN 800 MG/1
800 TABLET, FILM COATED ORAL EVERY 8 HOURS PRN
Qty: 180 TABLET | Refills: 1 | Status: SHIPPED | OUTPATIENT
Start: 2025-04-01

## 2025-04-01 RX ORDER — LIDOCAINE HYDROCHLORIDE 20 MG/ML
10 SOLUTION OROPHARYNGEAL
Status: COMPLETED | OUTPATIENT
Start: 2025-04-01 | End: 2025-04-01

## 2025-04-01 RX ORDER — IBUPROFEN 400 MG/1
800 TABLET, FILM COATED ORAL ONCE
Status: COMPLETED | OUTPATIENT
Start: 2025-04-01 | End: 2025-04-01

## 2025-04-01 RX ADMIN — LIDOCAINE HYDROCHLORIDE 10 ML: 20 SOLUTION ORAL at 18:40

## 2025-04-01 RX ADMIN — IBUPROFEN 800 MG: 400 TABLET, FILM COATED ORAL at 18:39

## 2025-04-01 ASSESSMENT — PAIN - FUNCTIONAL ASSESSMENT: PAIN_FUNCTIONAL_ASSESSMENT: 0-10

## 2025-04-01 ASSESSMENT — PAIN SCALES - GENERAL: PAINLEVEL_OUTOF10: 8

## 2025-04-01 NOTE — ED TRIAGE NOTES
Patient reports sore throat and swollen gland on left side, no fever. Hypertensive, states he has not taken BP medicine in \"months\" and does not have any

## 2025-04-02 NOTE — ED PROVIDER NOTES
(MEDROL DOSEPACK) 4 MG tablet Use as directed on packHistorical Med             SCREENINGS               No data recorded         PHYSICAL EXAM      Vitals:    04/01/25 1630   BP: (!) 173/122   Pulse: 95   Resp: 16   Temp: 98.2 °F (36.8 °C)   SpO2: 100%   Weight: 117.9 kg (260 lb)   Height: 1.727 m (5' 8\")     Physical Exam    Nursing notes and vital signs reviewed  Obese male who appears in no acute distress.  Head: Normocephalic, Atraumatic  Eyes: EOMI  Throat is slightly erythematous, minimal swelling, no exudates noted.  Small palpable mildly tender node left anterior throat.  TMs appear intact.  Neck: Supple  Cardiovascular: Regular rate and rhythm, no murmurs, rubs, or gallops  Chest: Normal work of breathing and chest excursion bilaterally  Lungs: Clear to ausculation bilaterally  Abdomen: Soft, non tender, non distended, normoactive bowel sounds  Back: No evidence of trauma or deformity  Extremities: No evidence of trauma or deformity, no LE edema  Skin: Warm and dry, normal cap refill  Neuro: Alert and appropriate, CN intact, normal speech, strength and sensation full and symmetric bilaterally, normal gait, normal coordination  Psychiatric: Normal mood and affect     DIAGNOSTIC RESULTS   LABS:     Labs Reviewed   STREP A, PCR        EKG:   EKG interpretation: (Preliminary)  EKG read by Dr. Ortega Fong     RADIOLOGY:  Non-plain film images such as CT, Ultrasound and MRI are read by the radiologist. Plain radiographic images are visualized and preliminarily interpreted by the ED Provider with the below findings:          Interpretation per the Radiologist below, if available at the time of this note:     No orders to display        PROCEDURES   Unless otherwise noted below, none  Procedures     CRITICAL CARE TIME       EMERGENCY DEPARTMENT COURSE and DIFFERENTIAL DIAGNOSIS/MDM   Vitals:    Vitals:    04/01/25 1630   BP: (!) 173/122   Pulse: 95   Resp: 16   Temp: 98.2 °F (36.8 °C)   SpO2: 100%   Weight: